# Patient Record
Sex: MALE | Race: WHITE | NOT HISPANIC OR LATINO | URBAN - METROPOLITAN AREA
[De-identification: names, ages, dates, MRNs, and addresses within clinical notes are randomized per-mention and may not be internally consistent; named-entity substitution may affect disease eponyms.]

---

## 2019-08-14 ENCOUNTER — OFFICE VISIT (OUTPATIENT)
Dept: INTERNAL MEDICINE | Facility: CLINIC | Age: 34
End: 2019-08-14

## 2019-08-14 VITALS
BODY MASS INDEX: 29.83 KG/M2 | DIASTOLIC BLOOD PRESSURE: 80 MMHG | HEIGHT: 66 IN | WEIGHT: 185.6 LBS | TEMPERATURE: 97.8 F | SYSTOLIC BLOOD PRESSURE: 122 MMHG

## 2019-08-14 DIAGNOSIS — I10 BENIGN ESSENTIAL HTN: Primary | ICD-10-CM

## 2019-08-14 DIAGNOSIS — F41.9 ANXIETY: ICD-10-CM

## 2019-08-14 PROCEDURE — 85027 COMPLETE CBC AUTOMATED: CPT | Performed by: INTERNAL MEDICINE

## 2019-08-14 PROCEDURE — 80061 LIPID PANEL: CPT | Performed by: INTERNAL MEDICINE

## 2019-08-14 PROCEDURE — 99203 OFFICE O/P NEW LOW 30 MIN: CPT | Performed by: INTERNAL MEDICINE

## 2019-08-14 PROCEDURE — 80053 COMPREHEN METABOLIC PANEL: CPT | Performed by: INTERNAL MEDICINE

## 2019-08-14 RX ORDER — ALPRAZOLAM 0.5 MG/1
0.5 TABLET ORAL AS NEEDED
COMMUNITY
End: 2019-08-14

## 2019-08-14 RX ORDER — CHLORTHALIDONE 25 MG/1
25 TABLET ORAL DAILY
COMMUNITY
End: 2019-09-10 | Stop reason: ALTCHOICE

## 2019-08-14 RX ORDER — HYDROXYZINE HYDROCHLORIDE 25 MG/1
25 TABLET, FILM COATED ORAL DAILY PRN
Qty: 7 TABLET | Refills: 0 | Status: SHIPPED | OUTPATIENT
Start: 2019-08-14 | End: 2019-08-15 | Stop reason: SDUPTHER

## 2019-08-14 NOTE — PROGRESS NOTES
Subjective   Sean Zarate is a 33 y.o. male here to establish care for HTN and anxiety. HTN: has been a med since his 20s. He has been on chlorthalidone a long time and has done well on it. Has not had labs done recently. Never been told he has kidney dysfunction. Denies SE to med. He has anxiety as well which is situational. Says some days he will just feel very anxious and can't concentrate. He was given xanax by previous provider but doesn't like that it makes him so sleepy. He doesn't want a med that's every day, he would rather have a med that's PRN. Denies depression. He is a  for the [employer] so his job is very physical.     Review of Systems   Constitutional: Negative.    HENT: Negative.    Eyes: Negative.    Respiratory: Negative.    Cardiovascular: Negative.    Gastrointestinal: Negative.    Endocrine: Negative.    Genitourinary: Negative.    Musculoskeletal: Negative.    Skin: Negative.    Allergic/Immunologic: Negative.    Neurological: Negative.    Hematological: Negative.    Psychiatric/Behavioral: The patient is nervous/anxious.        Past Medical History:   Diagnosis Date   • Asthma    • Depression    • Headache    • Hypertension      Family History   Problem Relation Age of Onset   • Hypertension Father    • Diabetes Other    • Arthritis Other      History reviewed. No pertinent surgical history.  Social History     Socioeconomic History   • Marital status:      Spouse name: Not on file   • Number of children: Not on file   • Years of education: Not on file   • Highest education level: Not on file   Tobacco Use   • Smoking status: Never Smoker   • Smokeless tobacco: Never Used   Substance and Sexual Activity   • Alcohol use: Yes   • Drug use: No         Current Outpatient Medications:   •  ALPRAZolam (XANAX) 0.5 MG tablet, Take 0.5 mg by mouth As Needed for Anxiety., Disp: , Rfl:   •  chlorthalidone (HYGROTON) 25 MG tablet, Take 25 mg by mouth Daily., Disp: , Rfl:     Objective   /80  "(BP Location: Left arm, Patient Position: Sitting, Cuff Size: Large Adult)   Temp 97.8 °F (36.6 °C) (Temporal)   Ht 167.6 cm (66\")   Wt 84.2 kg (185 lb 9.6 oz)   BMI 29.96 kg/m²   Physical Exam   Constitutional: He is oriented to person, place, and time. He appears well-developed and well-nourished.   HENT:   Head: Normocephalic and atraumatic.   Nose: Nose normal.   Eyes: Conjunctivae are normal.   Cardiovascular: Normal rate, regular rhythm and normal heart sounds. Exam reveals no gallop and no friction rub.   No murmur heard.  Pulmonary/Chest: Effort normal and breath sounds normal. He has no wheezes.   Musculoskeletal:   Normal gait and station   Neurological: He is alert and oriented to person, place, and time.   Skin: Skin is warm and dry.   Psychiatric: He has a normal mood and affect. His behavior is normal. Judgment and thought content normal.   Vitals reviewed.      Assessment/Plan   Sean was seen today for establish care.    Diagnoses and all orders for this visit:    Benign essential HTN  -     CBC (No Diff)  -     Comprehensive Metabolic Panel  -     Lipid Panel  -     Routine labs ordered, BP at goal. Continue chlorthalidone.  -     Chronic problem    Anxiety  -     hydrOXYzine (ATARAX) 25 MG tablet; Take 1 tablet by mouth Daily As Needed for Anxiety. Xanax makes pt too sleepy, will try vistaril as he wishes to do a PRN med. Stop xanax. Recheck 2 weeks.  -     Uncontrolled new problem           "

## 2019-08-15 ENCOUNTER — TELEPHONE (OUTPATIENT)
Dept: INTERNAL MEDICINE | Facility: CLINIC | Age: 34
End: 2019-08-15

## 2019-08-15 DIAGNOSIS — F41.9 ANXIETY: ICD-10-CM

## 2019-08-15 LAB
ALBUMIN SERPL-MCNC: 4.9 G/DL (ref 3.5–5.2)
ALBUMIN/GLOB SERPL: 1.9 G/DL
ALP SERPL-CCNC: 56 U/L (ref 39–117)
ALT SERPL W P-5'-P-CCNC: 21 U/L (ref 1–41)
ANION GAP SERPL CALCULATED.3IONS-SCNC: 15.5 MMOL/L (ref 5–15)
AST SERPL-CCNC: 30 U/L (ref 1–40)
BILIRUB SERPL-MCNC: 0.5 MG/DL (ref 0.2–1.2)
BUN BLD-MCNC: 18 MG/DL (ref 6–20)
BUN/CREAT SERPL: 18.6 (ref 7–25)
CALCIUM SPEC-SCNC: 9.7 MG/DL (ref 8.6–10.5)
CHLORIDE SERPL-SCNC: 103 MMOL/L (ref 98–107)
CHOLEST SERPL-MCNC: 186 MG/DL (ref 0–200)
CO2 SERPL-SCNC: 23.5 MMOL/L (ref 22–29)
CREAT BLD-MCNC: 0.97 MG/DL (ref 0.76–1.27)
DEPRECATED RDW RBC AUTO: 41.8 FL (ref 37–54)
ERYTHROCYTE [DISTWIDTH] IN BLOOD BY AUTOMATED COUNT: 12.3 % (ref 12.3–15.4)
GFR SERPL CREATININE-BSD FRML MDRD: 90 ML/MIN/1.73
GLOBULIN UR ELPH-MCNC: 2.6 GM/DL
GLUCOSE BLD-MCNC: 78 MG/DL (ref 65–99)
HCT VFR BLD AUTO: 46.7 % (ref 37.5–51)
HDLC SERPL-MCNC: 52 MG/DL (ref 40–60)
HGB BLD-MCNC: 15.2 G/DL (ref 13–17.7)
LDLC SERPL CALC-MCNC: 108 MG/DL (ref 0–100)
LDLC/HDLC SERPL: 2.07 {RATIO}
MCH RBC QN AUTO: 30 PG (ref 26.6–33)
MCHC RBC AUTO-ENTMCNC: 32.5 G/DL (ref 31.5–35.7)
MCV RBC AUTO: 92.3 FL (ref 79–97)
PLATELET # BLD AUTO: 256 10*3/MM3 (ref 140–450)
PMV BLD AUTO: 10.8 FL (ref 6–12)
POTASSIUM BLD-SCNC: 4.7 MMOL/L (ref 3.5–5.2)
PROT SERPL-MCNC: 7.5 G/DL (ref 6–8.5)
RBC # BLD AUTO: 5.06 10*6/MM3 (ref 4.14–5.8)
SODIUM BLD-SCNC: 142 MMOL/L (ref 136–145)
TRIGL SERPL-MCNC: 131 MG/DL (ref 0–150)
VLDLC SERPL-MCNC: 26.2 MG/DL (ref 5–40)
WBC NRBC COR # BLD: 5.69 10*3/MM3 (ref 3.4–10.8)

## 2019-08-15 RX ORDER — HYDROXYZINE HYDROCHLORIDE 25 MG/1
25 TABLET, FILM COATED ORAL DAILY PRN
Qty: 7 TABLET | Refills: 0 | Status: SHIPPED | OUTPATIENT
Start: 2019-08-15 | End: 2019-08-28

## 2019-08-28 ENCOUNTER — OFFICE VISIT (OUTPATIENT)
Dept: INTERNAL MEDICINE | Facility: CLINIC | Age: 34
End: 2019-08-28

## 2019-08-28 VITALS
HEIGHT: 66 IN | BODY MASS INDEX: 30.05 KG/M2 | DIASTOLIC BLOOD PRESSURE: 72 MMHG | TEMPERATURE: 97.7 F | WEIGHT: 187 LBS | SYSTOLIC BLOOD PRESSURE: 130 MMHG

## 2019-08-28 DIAGNOSIS — F41.9 ANXIETY: Primary | ICD-10-CM

## 2019-08-28 PROCEDURE — 99213 OFFICE O/P EST LOW 20 MIN: CPT | Performed by: INTERNAL MEDICINE

## 2019-08-28 RX ORDER — BUSPIRONE HYDROCHLORIDE 5 MG/1
5 TABLET ORAL 2 TIMES DAILY
Qty: 28 TABLET | Refills: 0 | Status: SHIPPED | OUTPATIENT
Start: 2019-08-28

## 2019-08-28 NOTE — PROGRESS NOTES
"Subjective   Sean Zarate is a 33 y.o. male here for follow-up anxiety. He tried the vistaril one tab he took at night and was so tired the next day he couldn't go to work. He has to work, so he hasn't taken the vistaril since. He was previously only on xanax, and doesn't want to be on a controlled med anymore. He does think he was on a SSRI or SNRI (a med he took once daily) but can't remember what it was. Says it didn't work. He doesn't feel anxious now, but at home he does feel anxious, easily irritated by his kids. He gets overwhelmed. Wants something to calm him down a bit, nothing strong.    The following portions of the patient's history were reviewed and updated as appropriate: allergies, current medications, past medical history, past social history and problem list.    Review of Systems:  General: negative  Neuro: negative  Psych: anxiety    Objective   /72 (BP Location: Left arm, Patient Position: Sitting, Cuff Size: Large Adult)   Temp 97.7 °F (36.5 °C) (Temporal)   Ht 167.6 cm (66\")   Wt 84.8 kg (187 lb)   BMI 30.18 kg/m²     Physical Exam   Constitutional: He is oriented to person, place, and time. He appears well-developed and well-nourished.   Pulmonary/Chest: Effort normal.   Neurological: He is alert and oriented to person, place, and time.   Skin: Skin is warm and dry.   Psychiatric: He has a normal mood and affect. His behavior is normal. Judgment and thought content normal.   Vitals reviewed.      Assessment/Plan   Sean was seen today for anxiety.    Diagnoses and all orders for this visit:    Anxiety  -     busPIRone (BUSPAR) 5 MG tablet; Take 1 tablet by mouth 2 (Two) Times a Day.  -stop vistaril, too sedating. Explained common SE of buspar and will start with 1 tab QD, increase to BID, and go up to 2 tab BID if necessary  -chronic uncontrolled, worsening off med           "

## 2019-08-30 DIAGNOSIS — M25.522 LEFT ELBOW PAIN: Primary | ICD-10-CM

## 2019-09-10 ENCOUNTER — OFFICE VISIT (OUTPATIENT)
Dept: ORTHOPEDIC SURGERY | Facility: CLINIC | Age: 34
End: 2019-09-10

## 2019-09-10 VITALS — BODY MASS INDEX: 30.05 KG/M2 | HEART RATE: 80 BPM | HEIGHT: 66 IN | WEIGHT: 186.95 LBS | OXYGEN SATURATION: 99 %

## 2019-09-10 DIAGNOSIS — M25.522 LEFT ELBOW PAIN: Primary | ICD-10-CM

## 2019-09-10 DIAGNOSIS — S46.912A STRAIN OF LEFT ELBOW, INITIAL ENCOUNTER: ICD-10-CM

## 2019-09-10 PROCEDURE — 99203 OFFICE O/P NEW LOW 30 MIN: CPT | Performed by: PHYSICIAN ASSISTANT

## 2019-09-10 RX ORDER — MELOXICAM 15 MG/1
TABLET ORAL
Qty: 90 TABLET | Refills: 0 | Status: SHIPPED | OUTPATIENT
Start: 2019-09-10

## 2019-09-10 NOTE — PROGRESS NOTES
[facility] Note    Subjective     Chief Complaint   Patient presents with   • Left Elbow - Pain        HPI  Sean Zarate is a 33 y.o. male.  Right-hand-dominant.  Patient presents orthopedic clinic for evaluation of his left elbow.  No history of injury or trauma.  States he began a few months ago he noted pain along the posterior lateral aspect of the elbow.  Occupation: .  Currently he endorses a pain scale 4/10.  Severity the pain mild to moderate.  Quality pain aching.  He denies any numbness or tingling in the extremity.  He has had no treatment to date.  No reported fever, chills, night sweats or other constitutional symptoms.      Past Medical History:   Diagnosis Date   • Asthma    • Depression    • Headache    • Hypertension       No past surgical history on file.   Family History   Problem Relation Age of Onset   • Hypertension Father    • Diabetes Other    • Arthritis Other      Social History     Socioeconomic History   • Marital status:      Spouse name: Not on file   • Number of children: Not on file   • Years of education: Not on file   • Highest education level: Not on file   Tobacco Use   • Smoking status: Never Smoker   • Smokeless tobacco: Never Used   Substance and Sexual Activity   • Alcohol use: Yes   • Drug use: No      Current Outpatient Medications on File Prior to Visit   Medication Sig Dispense Refill   • busPIRone (BUSPAR) 5 MG tablet Take 1 tablet by mouth 2 (Two) Times a Day. 28 tablet 0     No current facility-administered medications on file prior to visit.       Allergies   Allergen Reactions   • Penicillins Rash        The following portions of the patient's history were reviewed and updated as appropriate: allergies, current medications, past family history, past medical history, past social history, past surgical history and problem list.    Review of Systems   Constitutional: Negative.    HENT: Negative.    Eyes: Positive for redness and  "itching.   Respiratory: Negative.    Cardiovascular: Negative.    Gastrointestinal: Negative.    Endocrine: Negative.    Genitourinary: Negative.    Musculoskeletal: Positive for back pain and joint swelling.   Skin: Negative.    Allergic/Immunologic: Negative.    Neurological: Negative.    Hematological: Negative.    Psychiatric/Behavioral: Negative.         Objective      Physical Exam  Pulse 80   Ht 167.6 cm (65.98\")   Wt 84.8 kg (186 lb 15.2 oz)   SpO2 99%   BMI 30.19 kg/m²     Body mass index is 30.19 kg/m².    GENERAL APPEARANCE: awake, alert & oriented x 3, in no acute distress and well developed, well nourished  PSYCH: normal mood and affect  LUNGS:  breathing nonlabored, no wheezing  EYES: sclera anicteric, pupils equal  CARDIOVASCULAR: palpable pulses dorsalis pedis, palpable posterior tibial bilaterally. Capillary refill less than 2 seconds  INTEGUMENTARY: skin intact, no clubbing, cyanosis  NEUROLOGIC:  Normal Sensation and reflexes         Ortho Exam  Peripheral Vascular   Left Upper Extremity    No cyanotic nail beds    Pink nail beds and rapid capillary refill   Palpation    Radial Pulse - Bilaterally normal    Neurologic   Sensory - Elbow   Inspection and Palpation:    Light touch: intact - right hand   Muscle Strength and Tone:    Left bicep - 5/5    Left tricep - 5/5    Left wrist extensors - 5/5     Left wrist flexors - 5/5    Left intrinsics - 5/5    Musculoskeletal   Left Upper Extremity - Elbow   Inspection and Palpation     Tenderness -positive lateral and posterior aspect of the shoulder along lateral epicondyle and triceps insertion into olecranon    Effusion - none    Crepitus - none   Range of Motion    Flexion: AROM -150 degrees    Extension - AROM -0 degrees     Forearm supination: AROM -90 degrees    Forearm pronation - AROM -90 degrees   Instability    Left - tennis elbow test mild discomfort   Deformities/Malalignments/Discepancies - none   Functional testing:    Valgus stress " test negative    Varus stress test negative    Elbow flexion test negative    Tinel's sign at Cubital tunnel negative    Resistive supination does cause an exacerbation of pain.    Imaging/Studies  Ordered plain film imaging of the left elbow.  Images reviewed by Dr. Alcantar.    Imaging Results (last 7 days)     Procedure Component Value Units Date/Time    XR Elbow 3+ View Left [[id number]] Resulted:  09/10/19 1621     Updated:  09/10/19 1622    Narrative:       Left Elbow X-Ray    Indication: Pain    Views: AP, Oblique, and Lateral views    Comparison: None    Findings:  No fracture  No bony lesion  Normal soft tissues  Normal joint spaces    Impression: Negative left elbow x-ray for acute bony abnormality                    Assessment/Plan        ICD-10-CM ICD-9-CM   1. Left elbow pain M25.522 719.42   2. Strain of left elbow, initial encounter S56.912A 841.9       Orders Placed This Encounter   Procedures   • XR Elbow 3+ View Left   • Ambulatory Referral to Physical Therapy Ortho, Evaluate and treat        Left elbow pain due to strain secondary to probable repetitive activities.  Ordered formal physical therapy.  Prescribed Mobic to help assist with inflammation and pain control.  Follow-up in 4 weeks for repeat evaluation, sooner if issues arise or symptoms worsen/change.  May consider possible lateral elbow injection depending on his response to formal PT and Mobic.  Questions and concerns answered.    Medical Decision Making  Management Options : prescription/IM medicine and physical/occupational therapy  Data/Risk: radiology tests       Maryjo Cartwright PA-C  09/13/19  9:11 AM         EMR Dragon/Transcription disclaimer:  Much of this encounter note is an electronic transcription of spoken language to printed text. Electronic transcription of spoken language may permit erroneous, or at times, nonsensical words or phrases to be inadvertently transcribed. Although I have reviewed the note for such  errors, some may still exist.

## 2019-09-20 ENCOUNTER — HOSPITAL ENCOUNTER (OUTPATIENT)
Dept: PHYSICAL THERAPY | Facility: HOSPITAL | Age: 34
Setting detail: THERAPIES SERIES
Discharge: HOME | End: 2019-09-20

## 2019-09-20 DIAGNOSIS — M25.522 LEFT ELBOW PAIN: Primary | ICD-10-CM

## 2019-09-20 PROCEDURE — 97161 PT EVAL LOW COMPLEX 20 MIN: CPT | Performed by: PHYSICAL THERAPIST

## 2019-09-20 NOTE — THERAPY EVALUATION
Outpatient Physical Therapy Ortho Initial Evaluation   [facility]     Patient Name: Sean Zarate  : 1985  MRN: [id number]  Today's Date: 2019      Visit Date: 2019    Patient Active Problem List   Diagnosis   • Anxiety   • Benign essential HTN        Past Medical History:   Diagnosis Date   • Asthma    • Depression    • Headache    • Hypertension         No past surgical history on file.    Visit Dx:     ICD-10-CM ICD-9-CM   1. Left elbow pain M25.522 719.42         Patient History     Row Name 19 1400             History    Chief Complaint  Pain  -CR      Type of Pain  Elbow pain  -CR      Date Current Problem(s) Began  19  -CR      Brief Description of Current Complaint  32 yo male reports left elbow pain over the past few month that affects job as .  He reports pain is intermittent and has not worsened nor improved over past few weeks.  Recent anti inflammatory has been beneficial but continues to have pain.  Carrying and gripping seems to worsen sympotms  -CR      Previous treatment for THIS PROBLEM  Medication  -CR      Patient/Caregiver Goals  Relieve pain  -CR      Current Tobacco Use  no  -CR      Smoking Status  no  -CR      Patient's Rating of General Health  Good  -CR      Hand Dominance  right-handed  -CR      Occupation/sports/leisure activities  , [place]   -CR      What clinical tests have you had for this problem?  X-ray  -CR      Results of Clinical Tests  (-) for fracture  -CR         Pain     Pain Location  Elbow  -CR      Pain at Present  0  -CR      Pain at Worst  7  -CR      Pain Frequency  Several days a week;Intermittent  -CR      Pain Description  Sharp  -CR      What Performance Factors Make the Current Problem(s) WORSE?  carrying objects, lifting  -CR      Is your sleep disturbed?  No  -CR      Difficulties at work?  yes, works as a   -CR      Difficulties with ADL's?  yes, grasping  -CR         Fall Risk Assessment    Any falls in the  past year:  No  -CR      Does patient have a fear of falling  No  -CR         Daily Activities    Primary Language  English  -CR      How does patient learn best?  -- none selected  -CR      Teaching needs identified  Home Exercise Program;Management of Condition  -CR      Patient is concerned about/has problems with  Performing job responsibilities/community activities (work, school,;Performing home management (household chores, shopping, care of dependents)  -CR      Does patient have problems with the following?  Anxiety  -CR      Barriers to learning  None  -CR      Pt Participated in POC and Goals  Yes  -CR         Safety    Are you being hurt, hit, or frightened by anyone at home or in your life?  No  -CR      Are you being neglected by a caregiver  No  -CR        User Key  (r) = Recorded By, (t) = Taken By, (c) = Cosigned By    Initials Name Provider Type    Raheem Gomez, PT Physical Therapist          PT Ortho     Row Name 09/20/19 1500       Special Tests/Palpation    Special Tests/Palpation  Elbow/Forearm  -CR       Elbow Accessory Motions    Elbow Accesssory Motions Tested?  Yes  -CR    Distraction of ulna on humerus (90 degrees)  Hypomobile;Left pain  -CR       Elbow/Forearm Special Tests    Valgus Stress at 30 Degrees (UCL Lesion)  Negative  -CR    Varus Stress at 30 Degrees (RCL Lesion)  Negative  -CR    Valgus Extension Overload (OA vs. UCL Lesion)  Negative  -CR    Medial Epicondyle Test (Golfer's Elbow)  Negative  -CR    Lateral Epicondyle Test (Tennis Elbow)  Negative  -CR       Elbow/Forearm Palpation    Elbow/Forearm Palpation?  Yes  -CR    Triceps Insertion  Tender;Fibrotic  -CR       General ROM    LT Upper Ext  Lt Elbow Extension/Flexion  -CR       Left Upper Ext    Lt Elbow Extension/Flexion AROM  0/150  -CR       MMT (Manual Muscle Testing)    Lt Upper Ext  Lt Elbow Extension;Lt Elbow Flexion;Lt Forearm Supination;Lt Forearm Pronation  -CR       MMT Left Upper Ext    Lt Elbow  Flexion MMT, Gross Movement  (5/5) normal  -CR    Lt Elbow Extension MMT, Gross Movement  (4+/5) good plus  -CR    Lt Forearm Supination MMT, Gross Movement  (5/5) normal  -CR    Lt Forearm Pronation MMT, Gross Movement  (5/5) normal  -CR    Lt Upper Extremity Comments   Elbow extension only minimilly provocative at high degrees of flexion  -CR      User Key  (r) = Recorded By, (t) = Taken By, (c) = Cosigned By    Initials Name Provider Type    Raheem Gomez PT Physical Therapist                      Therapy Education  Education Details: Client provided written HEP including self distraction elbow mobilitzation, soft tissue work at triceps tendon, and isometric triceps loading  Given: HEP, Symptoms/condition management, Pain management  Program: New  How Provided: Demonstration, Written, Verbal  Provided to: Patient  Level of Understanding: Verbalized, Demonstrated     PT OP Goals     Row Name 09/20/19 1500          PT Short Term Goals    STG Date to Achieve  10/18/19  -CR     STG 1  client will be independent with initial HEP  -CR     STG 1 Progress  New  -CR     STG 2  client will demonstrate decreased TTP to left triceps tendon  -CR     STG 2 Progress  New  -CR     STG 3  client will report completion of work day without pain or limtiation  -CR     STG 3 Progress  New  -CR     STG 4  client will report qDASh limited < 10%   -CR     STG 4 Progress  New  -CR        Time Calculation    PT Goal Re-Cert Due Date  12/19/19  -CR       User Key  (r) = Recorded By, (t) = Taken By, (c) = Cosigned By    Initials Name Provider Type    Raheem Gomez PT Physical Therapist          PT Assessment/Plan     Row Name 09/20/19 1538          PT Assessment    Functional Limitations  Performance in self-care ADL;Performance in leisure activities;Performance in work activities  -CR     Impairments  Pain;Joint mobility  -CR     Assessment Comments  34 yo male arrives with evolving sympotms of low complexity.  He  demonstrates TTP along triceps tendon/insertion, joint hypomobility in distraction that is provocative of chief complaint, and minimal pain with MMT of triceps in flexion of elbow.  Client has been provided initial HEP and will initaite management per instruction and follow up at 1x/week frequency.    -CR     Please refer to paper survey for additional self-reported information  Yes  -CR     Rehab Potential  Good  -CR     Patient/caregiver participated in establishment of treatment plan and goals  Yes  -CR     Patient would benefit from skilled therapy intervention  Yes  -CR        PT Plan    PT Frequency  1x/week  -CR     Predicted Duration of Therapy Intervention (Therapy Eval)  6 visits  -CR     Planned CPT's?  PT EVAL LOW COMPLEXITY: 64145;PT MANUAL THERAPY EA 15 MIN: 59367;PT HOT/COLD PACK WC NONMCARE: 80187;PT SELF CARE/HOME MGMT/TRAIN EA 15: 25698;PT THER ACT EA 15 MIN: 65616;PT THER PROC EA 15 MIN: 25723  -CR     Physical Therapy Interventions (Optional Details)  home exercise program;joint mobilization;manual therapy techniques;modalities;neuromuscular re-education;patient/family education;strengthening  -CR     PT Plan Comments  soft tissue at triceps tendon/insertion, elbow joint mobilization, triceps loadid as tolerated, consider eccentric  -CR       User Key  (r) = Recorded By, (t) = Taken By, (c) = Cosigned By    Initials Name Provider Type    Raheem Gomez, PT Physical Therapist                              Outcome Measure Options: Quick DASH  Quick DASH  Open a tight or new jar.: Moderate Difficulty  Do heavy household chores (e.g., wash walls, wash floors): Moderate Difficulty  Carry a shopping bag or briefcase: Severe Difficulty  Wash your back: No Difficulty  Use a knife to cut food: No Difficulty  Recreational activities in which you take some force or impact through your arm, should or hand (e.g. golf, hammering, tennis, etc.): No Difficulty  During the past week, to what extent has  your arm, shoulder, or hand problem interfered with your normal social activites with family, friends, neighbors or groups?: Slightly  During the past week, were you limited in your work or other regular daily activities as a result of your arm, shoulder or hand problem?: Slightly Limited  Arm, Shoulder, or hand pain: Moderate  Tingling (pins and needles) in your arm, shoulder, or hand: Mild  During the past week, how much difficulty have you had sleeping because of the pain in your arm, shoulder or hand?: No difficulty  Number of Questions Answered: 11  Quick DASH Score: 27.27         Time Calculation:     Start Time: 1400     Therapy Charges for Today     Code Description Service Date Service Provider Modifiers Qty    60837778980 HC PT EVAL LOW COMPLEXITY 3 9/20/2019 Raheem Alexander, PT GP 1          PT G-Codes  Outcome Measure Options: Quick DASH  Quick DASH Score: 27.27         Raheem Alexander, PT  9/20/2019

## 2019-10-01 ENCOUNTER — HOSPITAL ENCOUNTER (OUTPATIENT)
Dept: PHYSICAL THERAPY | Facility: HOSPITAL | Age: 34
Setting detail: THERAPIES SERIES
Discharge: HOME | End: 2019-10-01

## 2019-10-01 DIAGNOSIS — M25.522 LEFT ELBOW PAIN: Primary | ICD-10-CM

## 2019-10-01 PROCEDURE — 97140 MANUAL THERAPY 1/> REGIONS: CPT | Performed by: PHYSICAL THERAPIST

## 2019-10-01 NOTE — THERAPY TREATMENT NOTE
Outpatient Physical Therapy Ortho Treatment Note   [facility]     Patient Name: Sean Zarate  : 1985  MRN: [id number]  Today's Date: 10/1/2019      Visit Date: 10/01/2019    Visit Dx:    ICD-10-CM ICD-9-CM   1. Left elbow pain M25.522 719.42       Patient Active Problem List   Diagnosis   • Anxiety   • Benign essential HTN        Past Medical History:   Diagnosis Date   • Asthma    • Depression    • Headache    • Hypertension         No past surgical history on file.                    PT Assessment/Plan     Row Name 10/01/19 7532          PT Assessment    Assessment Comments  Client demonstrates significant decreased in TTP at triceps today.  Demonstrates improved AROM and tolerance of t band work as well.  Will conitbue to progress with HEP with change from isometric to AROM strengthening.  Will follow up next week and should symptoms remain stable transition to HEP.    -CR       User Key  (r) = Recorded By, (t) = Taken By, (c) = Cosigned By    Initials Name Provider Type    Raheem Gomez, MEJIA Physical Therapist            OP Exercises     Row Name 10/01/19 1700             Subjective Comments    Subjective Comments  Client reports decreased pain, 1 incident of intermittent pain since last session, overall improved  -CR         Subjective Pain    Able to rate subjective pain?  yes  -CR      Pre-Treatment Pain Level  0  -CR      Post-Treatment Pain Level  0  -CR         Total Minutes    13345 - PT Manual Therapy Minutes  10  -CR         Exercise 1    Exercise Name 1  Tband tricep extensions  -CR      Sets 1  2  -CR      Reps 1  10  -CR      Additional Comments  red --> green, provided for HEP  -CR        User Key  (r) = Recorded By, (t) = Taken By, (c) = Cosigned By    Initials Name Provider Type    Raheem Gomez, MEJIA Physical Therapist                      Manual Rx (last 36 hours)      Manual Treatments     Row Name 10/01/19 1700             Total Minutes    75512 - PT Manual Therapy  Minutes  10  -CR         Manual Rx 1    Manual Rx 1 Location  left elbow triceps tendon IASTM  -CR      Manual Rx 1 Duration  10  -CR        User Key  (r) = Recorded By, (t) = Taken By, (c) = Cosigned By    Initials Name Provider Type    Raheem Gomez, PT Physical Therapist              Therapy Education  Education Details: Provided updated red to green band for TBand AROM              Time Calculation:   Start Time: 1710  Therapy Charges for Today     Code Description Service Date Service Provider Modifiers Qty    [id number]  PT MANUAL THERAPY EA 15 MIN 10/1/2019 Raheem Alexander, PT GP 1                    Raheem Alexander, MEJIA  10/1/2019

## 2019-10-08 ENCOUNTER — OFFICE VISIT (OUTPATIENT)
Dept: ORTHOPEDIC SURGERY | Facility: CLINIC | Age: 34
End: 2019-10-08

## 2019-10-08 ENCOUNTER — HOSPITAL ENCOUNTER (OUTPATIENT)
Dept: PHYSICAL THERAPY | Facility: HOSPITAL | Age: 34
Setting detail: THERAPIES SERIES
Discharge: HOME | End: 2019-10-08

## 2019-10-08 VITALS — WEIGHT: 186.95 LBS | HEIGHT: 66 IN | HEART RATE: 82 BPM | BODY MASS INDEX: 30.05 KG/M2 | OXYGEN SATURATION: 100 %

## 2019-10-08 DIAGNOSIS — S46.912D STRAIN OF LEFT ELBOW, SUBSEQUENT ENCOUNTER: Primary | ICD-10-CM

## 2019-10-08 DIAGNOSIS — M25.522 LEFT ELBOW PAIN: Primary | ICD-10-CM

## 2019-10-08 PROCEDURE — 99212 OFFICE O/P EST SF 10 MIN: CPT | Performed by: PHYSICIAN ASSISTANT

## 2019-10-08 PROCEDURE — 97110 THERAPEUTIC EXERCISES: CPT | Performed by: PHYSICAL THERAPIST

## 2019-10-08 NOTE — PROGRESS NOTES
"    [facility] [place] [facility] Note    Subjective     CC: Follow-up (3.5 week recheck  - Left elbow pain)      ERICK Zarate is a 34 y.o. male.  Patient returns today for follow-up evaluation of his left elbow.  He was referred to PT for elbow strain.  He reports that with PT he did well.  All pain has resolved.  No reported numbness or tingling into the extremity.  He does have one follow-up appointment with PT today.    ROS:    Constiutional:Pt denies fever, chills, nausea, or vomiting.  MSK:as above    Objective      Past Medical History  Past Medical History:   Diagnosis Date   • Asthma    • Depression    • Headache    • Hypertension          Physical Exam  Pulse 82   Ht 167.6 cm (65.98\")   Wt 84.8 kg (186 lb 15.2 oz)   SpO2 100%   BMI 30.19 kg/m²     Body mass index is 30.19 kg/m².    Patient is well nourished and well developed.        Ortho Exam  Peripheral Vascular   Left Upper Extremity    No cyanotic nail beds    Pink nail beds and rapid capillary refill   Palpation    Radial Pulse - Bilaterally normal    Neurologic   Sensory - Elbow   Inspection and Palpation:    Light touch: intact - right hand   Muscle Strength and Tone:    Left bicep - 5/5    Left tricep - 5/5    Left wrist extensors - 5/5     Left wrist flexors - 5/5    Left intrinsics - 5/5    Musculoskeletal   Left Upper Extremity - Elbow   Inspection and Palpation     Tenderness -none    Effusion - none    Crepitus - none   Range of Motion    Flexion: AROM - 150 degrees    Extension - AROM - 0 degrees     Forearm supination: AROM - 90 degrees    Forearm pronation - AROM - 90 degrees   Instability    Left - tennis elbow test none   Deformities/Malalignments/Discepancies - none   Functional testing:    Valgus stress test negative    Varus stress test negative    Elbow flexion test negative    Tinel's sign at Cubital tunnel negative      Imaging/Labs/EMG Reviewed:  Imaging Results (last 24 hours)     ** No results found for the last 24 " hours. **      No new imaging today.      Assessment:  1. Strain of left elbow, subsequent encounter        Plan:  1. Strain to the left elbow resolved with PT.  2. Has final appointment today with PT anticipate discharge.  Recommend patient continue with exercises taught by PT to maintain range of motion strength.  3. May continue use of anti-inflammatories on an as-needed basis.  4. Follow-up in our clinic as needed.  5. Questions and concerns answered.      Maryjo Cartwright PA-C  10/10/19  8:27 AM

## 2019-10-08 NOTE — THERAPY DISCHARGE NOTE
Outpatient Physical Therapy Ortho Treatment Note/Discharge Summary   [facility]     Patient Name: Sean Zarate  : 1985  MRN: [id number]  Today's Date: 10/8/2019      Visit Date: 10/08/2019    Visit Dx:    ICD-10-CM ICD-9-CM   1. Left elbow pain M25.522 719.42       Patient Active Problem List   Diagnosis   • Anxiety   • Benign essential HTN        Past Medical History:   Diagnosis Date   • Asthma    • Depression    • Headache    • Hypertension         No past surgical history on file.    PT Ortho     Row Name 10/08/19 1500       Elbow Accessory Motions    Distraction of ulna on humerus (90 degrees)  WNL  -CR       Elbow/Forearm Palpation    Elbow/Forearm Palpation?  No Tenderness/Abnormality  -CR       MMT Left Upper Ext    Lt Elbow Extension MMT, Gross Movement  (5/5) normal  -CR      User Key  (r) = Recorded By, (t) = Taken By, (c) = Cosigned By    Initials Name Provider Type    Raheem Gomez, PT Physical Therapist                              OP Exercises     Row Name 10/08/19 1500             Subjective Comments    Subjective Comments  Client reports having no pain over the past few days since previous session of therapy  -CR         Subjective Pain    Able to rate subjective pain?  yes  -CR      Pre-Treatment Pain Level  0  -CR      Post-Treatment Pain Level  0  -CR         Total Minutes    65372 - PT Therapeutic Exercise Minutes  15  -CR         Exercise 1    Exercise Name 1  Re assessment included in TE time  -CR         Exercise 2    Exercise Name 2  UE ergomerter  -CR      Time 2  6:00  -CR      Additional Comments  pain free  -CR        User Key  (r) = Recorded By, (t) = Taken By, (c) = Cosigned By    Initials Name Provider Type    Raheem Gomez, PT Physical Therapist                         PT OP Goals     Row Name 10/08/19 1500          PT Short Term Goals    STG Date to Achieve  10/18/19  -CR     STG 1  client will be independent with initial HEP  -CR     STG 1 Progress   Met  -CR     STG 2  client will demonstrate decreased TTP to left triceps tendon  -CR     STG 2 Progress  Met  -CR     STG 3  client will report completion of work day without pain or limtiation  -CR     STG 3 Progress  Met  -CR     STG 4  client will report qDASh limited < 10%   -CR     STG 4 Progress  Met  -CR       User Key  (r) = Recorded By, (t) = Taken By, (c) = Cosigned By    Initials Name Provider Type    Raheem Gomez, PT Physical Therapist               Outcome Measure Options: Quick DASH  Quick DASH  Open a tight or new jar.: No Difficulty  Do heavy household chores (e.g., wash walls, wash floors): No Difficulty  Carry a shopping bag or briefcase: No Difficulty  Wash your back: No Difficulty  Use a knife to cut food: No Difficulty  Recreational activities in which you take some force or impact through your arm, should or hand (e.g. golf, hammering, tennis, etc.): No Difficulty  During the past week, to what extent has your arm, shoulder, or hand problem interfered with your normal social activites with family, friends, neighbors or groups?: Not at all  During the past week, were you limited in your work or other regular daily activities as a result of your arm, shoulder or hand problem?: Not limited at all  Arm, Shoulder, or hand pain: None  Tingling (pins and needles) in your arm, shoulder, or hand: None  During the past week, how much difficulty have you had sleeping because of the pain in your arm, shoulder or hand?: No difficulty  Number of Questions Answered: 11  Quick DASH Score: 0         Time Calculation:   Start Time: 1540  Therapy Charges for Today     Code Description Service Date Service Provider Modifiers Qty    64663400011 HC PT THER PROC EA 15 MIN 10/8/2019 Raheem Alexander, PT GP 1          PT G-Codes  Outcome Measure Options: Quick DASH  Quick DASH Score: 0     OP PT Discharge Summary  Date of Discharge: 10/08/19  Reason for Discharge: All goals achieved  Outcomes Achieved:  Able to achieve all goals within established timeline  Discharge Destination: Home with home program  Discharge Instructions/Additional Comments: Client has achieved all therapy goals and reports 0 dysfunction at this time.  He has been discharged at this time.        Raheem Alexander, PT  10/8/2019